# Patient Record
Sex: MALE | URBAN - METROPOLITAN AREA
[De-identification: names, ages, dates, MRNs, and addresses within clinical notes are randomized per-mention and may not be internally consistent; named-entity substitution may affect disease eponyms.]

---

## 2017-07-21 ENCOUNTER — IMPORTED ENCOUNTER (OUTPATIENT)
Dept: URBAN - METROPOLITAN AREA CLINIC 1 | Facility: CLINIC | Age: 77
End: 2017-07-21

## 2017-07-21 PROBLEM — H01.004: Noted: 2017-07-21

## 2017-07-21 PROBLEM — H25.813: Noted: 2017-07-21

## 2017-07-21 PROBLEM — H04.123: Noted: 2017-07-21

## 2017-07-21 PROBLEM — H35.033: Noted: 2017-07-21

## 2017-07-21 PROBLEM — H43.813: Noted: 2017-07-21

## 2017-07-21 PROBLEM — H01.001: Noted: 2017-07-21

## 2017-07-21 PROCEDURE — 92015 DETERMINE REFRACTIVE STATE: CPT

## 2017-07-21 PROCEDURE — 92014 COMPRE OPH EXAM EST PT 1/>: CPT

## 2017-07-21 NOTE — PATIENT DISCUSSION
1.  Cataract OU:  Visually Significant discussed the risks benefits alternatives and limitations of cataract surgery. The patient stated a full understanding and a desire to proceed with the procedure. The patient will need to return for preop appointment with cataract measurements and to have any additional questions answered and start pre-operative eye drops as directed. Patient did not see PMG today. Patient is right handed believe OD is dominant Phaco PCLOtherwise follow-up2. Dry Eyes OU -- Recommended to patient to use Artificial Tears BID OU3. GR I Hypertensive Retinopathy OU- Stable continue HTN Control4. PVD w/o Tear OU - Patient was cautioned to call our office immediately if they experience   a substantial change in their symptoms such as an increase in floaters persistent flashes loss of visual field (may appear as a shadow or a curtain) or decrease in visual acuity as these may indicate a retinal tear or detachment. 5.  Blepharitis anterior type OU - Daily warm compresses and lid scrubs were recommended. Return for an appointment for H and P.

## 2017-08-03 ENCOUNTER — IMPORTED ENCOUNTER (OUTPATIENT)
Dept: URBAN - METROPOLITAN AREA CLINIC 1 | Facility: CLINIC | Age: 77
End: 2017-08-03

## 2017-08-03 PROBLEM — H25.813: Noted: 2017-08-03

## 2017-08-03 PROCEDURE — 92136 OPHTHALMIC BIOMETRY: CPT

## 2017-08-03 NOTE — PATIENT DISCUSSION
Cataract OU:  Visually Significant secondary to glare discussed the risks benefits alternatives and limitations of cataract surgery. The patient stated a full understanding and a desire to proceed with the procedure. The patient will need to start pre-operative eye drops as directed. Proceed w/ phaco PCL OD then OSPt understands they will need glasses post-op to achieve their best corrected vision.

## 2017-08-09 ENCOUNTER — IMPORTED ENCOUNTER (OUTPATIENT)
Dept: URBAN - METROPOLITAN AREA CLINIC 1 | Facility: CLINIC | Age: 77
End: 2017-08-09

## 2017-08-10 ENCOUNTER — IMPORTED ENCOUNTER (OUTPATIENT)
Dept: URBAN - METROPOLITAN AREA CLINIC 1 | Facility: CLINIC | Age: 77
End: 2017-08-10

## 2017-08-10 PROBLEM — Z96.1: Noted: 2017-08-10

## 2017-08-10 PROCEDURE — 99024 POSTOP FOLLOW-UP VISIT: CPT

## 2017-08-10 NOTE — PATIENT DISCUSSION
POD#1 CE/IOL OD (Symfony MF- ZXROO) doing well. Continue all 3 gtts as prescribed and until gone. Use Durezol BID OD Ilevro Qdaily OD Ocuflox TID OD Post op Warnings Reiterated RTC as scheduled

## 2017-08-11 ENCOUNTER — IMPORTED ENCOUNTER (OUTPATIENT)
Dept: URBAN - METROPOLITAN AREA CLINIC 1 | Facility: CLINIC | Age: 77
End: 2017-08-11

## 2017-08-11 PROCEDURE — 99024 POSTOP FOLLOW-UP VISIT: CPT
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
## 2017-08-17 ENCOUNTER — IMPORTED ENCOUNTER (OUTPATIENT)
Dept: URBAN - METROPOLITAN AREA CLINIC 1 | Facility: CLINIC | Age: 77
End: 2017-08-17

## 2017-08-17 PROBLEM — Z96.1: Noted: 2017-08-17

## 2017-08-17 PROBLEM — H25.812: Noted: 2017-08-17

## 2017-08-17 PROCEDURE — 92136 OPHTHALMIC BIOMETRY: CPT

## 2017-08-17 NOTE — PATIENT DISCUSSION
1.  Cataract OS: Visually Significant secondary to glare discussed the risks benefits alternatives and limitations of cataract surgery. The patient stated a full understanding and a desire to proceed with the procedure. Discussed with patient and patient understands halos around lights and glare are expected post-op particularly at night with the MF lens choice. Pt understood. Phaco PCL OS; 2. POW#1  CE/IOL OD (Symfony- ZXROO) doing well.   Use Durezol BID OD till out Use Ilevro Qdaily OD till out F/u as scheduled 2nd eye

## 2017-08-23 ENCOUNTER — IMPORTED ENCOUNTER (OUTPATIENT)
Dept: URBAN - METROPOLITAN AREA CLINIC 1 | Facility: CLINIC | Age: 77
End: 2017-08-23

## 2017-08-24 ENCOUNTER — IMPORTED ENCOUNTER (OUTPATIENT)
Dept: URBAN - METROPOLITAN AREA CLINIC 1 | Facility: CLINIC | Age: 77
End: 2017-08-24

## 2017-08-24 PROBLEM — Z96.1: Noted: 2017-08-24

## 2017-08-24 PROCEDURE — 99024 POSTOP FOLLOW-UP VISIT: CPT

## 2017-08-24 NOTE — PATIENT DISCUSSION
1. POD#1 CE/IOL OS (Symfony MF- ZXROO) doing well. Continue all 3 gtts as prescribed and until gone. Use Durezol BID OS Ilevro Qdaily OS Ocuflox TID OS 2. POW#1  CE/IOL OD (Symfony MF- ZXROO) doing well.   Use Durezol BID OS till out Use Ilevro Qdaily OS till out F/u as scheeduled

## 2017-09-15 ENCOUNTER — IMPORTED ENCOUNTER (OUTPATIENT)
Dept: URBAN - METROPOLITAN AREA CLINIC 1 | Facility: CLINIC | Age: 77
End: 2017-09-15

## 2017-09-15 PROBLEM — Z96.1: Noted: 2017-09-15

## 2017-09-15 PROCEDURE — 99024 POSTOP FOLLOW-UP VISIT: CPT

## 2017-09-15 NOTE — PATIENT DISCUSSION
POW#3 Phaco/ PCL OU (Symfony OU ZXROO) Doing well. Finish PO meds per schedule Use ATs up to Q1hr OU w/a. Consider Plugs w/o improvement if patient remains symptomatic. Return for an appointment in 4m 30 with Dr. Iesha Hawkins.

## 2018-01-16 ENCOUNTER — IMPORTED ENCOUNTER (OUTPATIENT)
Dept: URBAN - METROPOLITAN AREA CLINIC 1 | Facility: CLINIC | Age: 78
End: 2018-01-16

## 2018-01-16 PROBLEM — H01.001: Noted: 2018-01-16

## 2018-01-16 PROBLEM — H35.033: Noted: 2018-01-16

## 2018-01-16 PROBLEM — H43.813: Noted: 2018-01-16

## 2018-01-16 PROBLEM — H16.143: Noted: 2018-01-16

## 2018-01-16 PROBLEM — H04.123: Noted: 2018-01-16

## 2018-01-16 PROBLEM — H01.004: Noted: 2018-01-16

## 2018-01-16 PROBLEM — Z96.1: Noted: 2018-01-16

## 2018-01-16 PROCEDURE — 92014 COMPRE OPH EXAM EST PT 1/>: CPT

## 2018-01-16 NOTE — PATIENT DISCUSSION
1.  RON w/ PEK OU- Increase ATs to TID OU Routinely. Plugs w/o improvement. 2.  Anterior Blepharitis OU - Cont Daily warm compresses and lid scrubs were recommended. 3. PVD OU - RD precautions. 4.  Pseudophakia OU - (Symfony Toric- ZXT) 5. GR I Hypertensive Retinopathy OU- Stable continue HTN ControlLetter to PCP Return for an appointment in 1 yr 27 with Dr. Jackelyn Ann.

## 2019-01-15 ENCOUNTER — IMPORTED ENCOUNTER (OUTPATIENT)
Dept: URBAN - METROPOLITAN AREA CLINIC 1 | Facility: CLINIC | Age: 79
End: 2019-01-15

## 2019-01-15 PROBLEM — H26.491: Noted: 2019-01-15

## 2019-01-15 PROBLEM — H01.004: Noted: 2019-01-15

## 2019-01-15 PROBLEM — Z96.1: Noted: 2019-01-15

## 2019-01-15 PROBLEM — H01.001: Noted: 2019-01-15

## 2019-01-15 PROBLEM — H43.813: Noted: 2019-01-15

## 2019-01-15 PROBLEM — H04.123: Noted: 2019-01-15

## 2019-01-15 PROBLEM — H35.033: Noted: 2019-01-15

## 2019-01-15 PROBLEM — H16.143: Noted: 2019-01-15

## 2019-01-15 PROCEDURE — 92014 COMPRE OPH EXAM EST PT 1/>: CPT

## 2019-01-15 NOTE — PATIENT DISCUSSION
1.  RON w/ PEK OU- Recommend ATs TID OU routinely 2. Anterior Blepharitis OU - Daily Hot compresses and lid scrubs were recommended. 3. PCO  OD (Posterior Capsule Opacification)  Observe and consider yag cap when pt feels pco visually significant and visual acuity decreases to appropriate level. 4. Pseudophakia OU - (Symfony Toric- ZXT) 5. GR I Hypertensive Retinopathy OU- Stable continue HTN Control6. PVD OU - RD precautions. Return for an appointment in 1 year 30/glare with Dr. Huang Pelaez.

## 2020-01-13 ENCOUNTER — IMPORTED ENCOUNTER (OUTPATIENT)
Dept: URBAN - METROPOLITAN AREA CLINIC 1 | Facility: CLINIC | Age: 80
End: 2020-01-13

## 2020-01-13 PROBLEM — H01.004: Noted: 2020-01-13

## 2020-01-13 PROBLEM — H16.143: Noted: 2020-01-13

## 2020-01-13 PROBLEM — H26.493: Noted: 2020-01-13

## 2020-01-13 PROBLEM — H01.001: Noted: 2020-01-13

## 2020-01-13 PROBLEM — H04.123: Noted: 2020-01-13

## 2020-01-13 PROCEDURE — 92014 COMPRE OPH EXAM EST PT 1/>: CPT

## 2020-01-13 NOTE — PATIENT DISCUSSION
1.  RON w/ PEK OU- Cont ATs TID OU Routinely. 2.  Anterior Blepharitis OU - Cont Daily Hot compresses and lid scrubs were recommended. 3. PCO OU: (Posterior Capsule Opacification) (Old OD New OS) Consider YAG Cap PRN once patient becomes symptomatic. 4.  Pseudophakia OU - (Symfony Toric- ZXT) 5. GR I Hypertensive Retinopathy OU- Stable continue HTN Control6. PVD OU - RD precautions. Letter to PCP MRx deferredReturn for an appointment in 6 mo 10 dfe glare (Consider YAG Cap) with Dr. Duglas Coombs.

## 2020-07-13 ENCOUNTER — IMPORTED ENCOUNTER (OUTPATIENT)
Dept: URBAN - METROPOLITAN AREA CLINIC 1 | Facility: CLINIC | Age: 80
End: 2020-07-13

## 2020-07-13 PROBLEM — H16.143: Noted: 2020-07-13

## 2020-07-13 PROBLEM — H01.001: Noted: 2020-07-13

## 2020-07-13 PROBLEM — H26.493: Noted: 2020-07-13

## 2020-07-13 PROBLEM — H01.004: Noted: 2020-07-13

## 2020-07-13 PROBLEM — H04.123: Noted: 2020-07-13

## 2020-07-13 PROBLEM — Z96.1: Noted: 2020-07-13

## 2020-07-13 PROCEDURE — 92015 DETERMINE REFRACTIVE STATE: CPT

## 2020-07-13 PROCEDURE — 92012 INTRM OPH EXAM EST PATIENT: CPT

## 2020-07-13 NOTE — PATIENT DISCUSSION
1.  RON w/ PEK OU- Cont ATs TID OU Routinely. 2.  PCO OU- Visually Significant *secondary to glare*; schedule YAG Cap. Pros cons risks and benefits. 3.  Pseudophakia OU - (Symfony Toric- ZXT) 4. Anterior Blepharitis OU- Cont Daily Hot compresses and lid scrubs were recommended. 5.  GR I Hypertensive Retinopathy OU- Stable continue HTN Control6. PVD OU - RD precautions. Return for an appointment in YAG Cap OD then OS with Dr. Mikayla Alcaraz.

## 2020-08-12 ENCOUNTER — IMPORTED ENCOUNTER (OUTPATIENT)
Dept: URBAN - METROPOLITAN AREA CLINIC 1 | Facility: CLINIC | Age: 80
End: 2020-08-12

## 2021-12-23 ENCOUNTER — IMPORTED ENCOUNTER (OUTPATIENT)
Dept: URBAN - METROPOLITAN AREA CLINIC 1 | Facility: CLINIC | Age: 81
End: 2021-12-23

## 2021-12-23 PROBLEM — H04.123: Noted: 2021-12-23

## 2021-12-23 PROBLEM — Z96.1: Noted: 2021-12-23

## 2021-12-23 PROBLEM — H01.024: Noted: 2021-12-23

## 2021-12-23 PROBLEM — H01.001: Noted: 2021-12-23

## 2021-12-23 PROBLEM — H01.004: Noted: 2021-12-23

## 2021-12-23 PROBLEM — H16.143: Noted: 2021-12-23

## 2021-12-23 PROBLEM — H01.021: Noted: 2021-12-23

## 2021-12-23 PROBLEM — H35.033: Noted: 2021-12-23

## 2021-12-23 PROBLEM — H43.813: Noted: 2021-12-23

## 2021-12-23 PROCEDURE — 99214 OFFICE O/P EST MOD 30 MIN: CPT

## 2021-12-23 NOTE — PATIENT DISCUSSION
1.  RON w/ PEK OU -- Recommended ATs TID OU routinely. 2.  Anterior Blepharitis OU -- Daily Hot compresses and lid scrubs were recommended. 3. 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 Observe. 5.  GR I Hypertensive Retinopathy OU -- Stable continue HTN Control6. PVD w/o Tear OU -- Stable. RD Precautions. Patient deferred MRx at today's visit. Return for an appointment in 1 year 27 with Dr. Da Triana.

## 2022-02-23 NOTE — PATIENT DISCUSSION
The patient was informed that with [address] [facility] for distance, they will need glasses for all near and intermediate activities after surgery. The patient understands there is a possibility they may need an enhancement after surgery. The patient elects [employer] Vision OS, goal of emmetropia.

## 2022-03-28 NOTE — PATIENT DISCUSSION
The patient was informed that with [address] [facility] for distance, they will need glasses for all near and intermediate activities after surgery. The patient understands there is a possibility they may need an enhancement after surgery. The patient elects Custom Vision OS, goal of emmetropia.

## 2022-04-02 ASSESSMENT — VISUAL ACUITY
OD_CC: 20/25
OS_SC: J1
OD_SC: J5
OD_SC: J1-2
OD_GLARE: 20/60
OD_CC: 20/30+2
OS_CC: 20/20
OS_SC: 20/25
OD_SC: J3
OD_SC: J5
OS_CC: 20/20-1
OD_CC: 20/20-1
OD_CC: 20/20-1
OS_CC: 20/20
OD_SC: 20/40
OD_CC: 20/20
OS_CC: 20/20-2
OS_GLARE: 20/100
OS_SC: 20/40-2
OS_GLARE: 20/40
OS_CC: 20/20
OD_SC: J1
OS_CC: 20/25
OD_SC: J1-2
OD_CC: 20/30+1
OD_SC: J5
OS_SC: J2
OS_GLARE: 20/40
OS_SC: J1
OD_SC: 20/40
OS_CC: 20/20
OD_CC: 20/20-2
OD_GLARE: 20/60
OD_CC: 20/20-2
OS_SC: J1-2
OS_SC: J2
OS_SC: J3
OD_SC: J1
OS_GLARE: 20/100
OS_SC: 20/40-2
OD_CC: 20/30
OS_SC: J1-2
OD_SC: J1
OD_GLARE: 20/100
OD_GLARE: 20/100
OD_CC: 20/25-1

## 2022-04-02 ASSESSMENT — TONOMETRY
OD_IOP_MMHG: 14
OD_IOP_MMHG: 14
OD_IOP_MMHG: 15
OS_IOP_MMHG: 13
OD_IOP_MMHG: 15
OS_IOP_MMHG: 13
OS_IOP_MMHG: 14
OD_IOP_MMHG: 12
OD_IOP_MMHG: 13
OD_IOP_MMHG: 13
OS_IOP_MMHG: 17
OS_IOP_MMHG: 15
OD_IOP_MMHG: 14
OS_IOP_MMHG: 14
OD_IOP_MMHG: 21
OS_IOP_MMHG: 15
OD_IOP_MMHG: 15
OD_IOP_MMHG: 13
OS_IOP_MMHG: 14
OD_IOP_MMHG: 15
OS_IOP_MMHG: 13

## 2022-09-13 NOTE — PATIENT DISCUSSION
The patient was informed that with [address] [facility] for distance, they will need glasses for all near and intermediate activities after surgery. The patient understands there is a possibility they may need an enhancement after surgery. The patient elects Custom Vision OS, goal of emmetropia.

## 2022-09-19 NOTE — PATIENT DISCUSSION
09/19/2022 Trial of mono CL. I/R reviewed. F/U 2 weeks. Ed pt that she may need glasses to drive or sew.

## 2022-10-06 NOTE — PATIENT DISCUSSION
The patient was informed that with [address] [facility] for distance, they will need glasses for all near and intermediate activities after surgery. The patient understands there is a possibility they may need an enhancement after surgery. The patient elects Custom Vision OS, goal of emmetropia.

## 2024-02-16 ENCOUNTER — COMPREHENSIVE EXAM (OUTPATIENT)
Dept: URBAN - METROPOLITAN AREA CLINIC 1 | Facility: CLINIC | Age: 84
End: 2024-02-16

## 2024-02-16 DIAGNOSIS — H04.123: ICD-10-CM

## 2024-02-16 DIAGNOSIS — H01.021: ICD-10-CM

## 2024-02-16 DIAGNOSIS — H01.024: ICD-10-CM

## 2024-02-16 DIAGNOSIS — H16.143: ICD-10-CM

## 2024-02-16 DIAGNOSIS — H35.033: ICD-10-CM

## 2024-02-16 PROCEDURE — 99214 OFFICE O/P EST MOD 30 MIN: CPT

## 2024-02-16 ASSESSMENT — VISUAL ACUITY
OD_SC: 20/20-2
OS_SC: 20/20-1

## 2024-02-16 ASSESSMENT — TONOMETRY
OS_IOP_MMHG: 13
OD_IOP_MMHG: 14

## 2025-02-25 ENCOUNTER — COMPREHENSIVE EXAM (OUTPATIENT)
Age: 85
End: 2025-02-25

## 2025-02-25 DIAGNOSIS — Z96.1: ICD-10-CM

## 2025-02-25 DIAGNOSIS — H01.024: ICD-10-CM

## 2025-02-25 DIAGNOSIS — H43.813: ICD-10-CM

## 2025-02-25 DIAGNOSIS — H16.143: ICD-10-CM

## 2025-02-25 DIAGNOSIS — H35.3131: ICD-10-CM

## 2025-02-25 DIAGNOSIS — H01.021: ICD-10-CM

## 2025-02-25 DIAGNOSIS — H04.123: ICD-10-CM

## 2025-02-25 DIAGNOSIS — H35.033: ICD-10-CM

## 2025-02-25 PROCEDURE — 99214 OFFICE O/P EST MOD 30 MIN: CPT
